# Patient Record
Sex: FEMALE | Race: WHITE | NOT HISPANIC OR LATINO
[De-identification: names, ages, dates, MRNs, and addresses within clinical notes are randomized per-mention and may not be internally consistent; named-entity substitution may affect disease eponyms.]

---

## 2018-12-17 ENCOUNTER — APPOINTMENT (OUTPATIENT)
Dept: OTOLARYNGOLOGY | Facility: CLINIC | Age: 28
End: 2018-12-17

## 2018-12-17 PROBLEM — Z00.00 ENCOUNTER FOR PREVENTIVE HEALTH EXAMINATION: Status: ACTIVE | Noted: 2018-12-17

## 2019-09-16 ENCOUNTER — APPOINTMENT (OUTPATIENT)
Dept: OTOLARYNGOLOGY | Facility: CLINIC | Age: 29
End: 2019-09-16
Payer: COMMERCIAL

## 2019-09-16 VITALS
DIASTOLIC BLOOD PRESSURE: 74 MMHG | WEIGHT: 125 LBS | SYSTOLIC BLOOD PRESSURE: 110 MMHG | OXYGEN SATURATION: 98 % | HEIGHT: 65 IN | HEART RATE: 80 BPM | BODY MASS INDEX: 20.83 KG/M2

## 2019-09-16 PROCEDURE — 99201 OFFICE OUTPATIENT NEW 10 MINUTES: CPT

## 2019-09-16 NOTE — PHYSICAL EXAM
[Normal] : normal appearance, well groomed, well nourished, and in no acute distress [de-identified] : left mid back scar/residual lipoma/fullness measuring approximately 3cm in diameter with hyperpigmentation scarring and palpable mobile fullness to the area

## 2019-09-16 NOTE — HISTORY OF PRESENT ILLNESS
[de-identified] : 29F w/ PMH of left back lipoma s/p excision, now with scarring/residual lipoma of left back. Patient reports she had a lipoma removed from her left back >2 years ago but has since noticed some fullness of the area. She denies any f/c, weight loss, night sweats, flank or abdominal pain.

## 2019-09-16 NOTE — ASSESSMENT
[FreeTextEntry1] : 29F w/ PMH of lipoma excision with residual lipoma and scarring.\par \par Plan:\par - in office scar revision and lipoma excision either 9/20 or 9/27

## 2019-09-30 ENCOUNTER — TRANSCRIPTION ENCOUNTER (OUTPATIENT)
Age: 29
End: 2019-09-30

## 2019-10-04 ENCOUNTER — APPOINTMENT (OUTPATIENT)
Dept: OTOLARYNGOLOGY | Facility: CLINIC | Age: 29
End: 2019-10-04
Payer: COMMERCIAL

## 2019-10-04 VITALS
DIASTOLIC BLOOD PRESSURE: 74 MMHG | BODY MASS INDEX: 20.49 KG/M2 | SYSTOLIC BLOOD PRESSURE: 112 MMHG | TEMPERATURE: 98 F | WEIGHT: 123 LBS | HEIGHT: 65 IN | OXYGEN SATURATION: 98 %

## 2019-10-04 PROCEDURE — 99213 OFFICE O/P EST LOW 20 MIN: CPT | Mod: 25

## 2019-10-04 PROCEDURE — 21930 EXC BACK LES SC < 3 CM: CPT | Mod: LT

## 2019-10-04 NOTE — ADDENDUM
[FreeTextEntry1] : Patient was advised she may shower with occlusive dressing in place, but to otherwise refrain from any stretching or heavy lifting to avoid pulling on the incision. She was asked to follow up Next week on Tuesday for incision check.

## 2019-10-08 ENCOUNTER — APPOINTMENT (OUTPATIENT)
Dept: OTOLARYNGOLOGY | Facility: CLINIC | Age: 29
End: 2019-10-08
Payer: COMMERCIAL

## 2019-10-08 VITALS
DIASTOLIC BLOOD PRESSURE: 65 MMHG | SYSTOLIC BLOOD PRESSURE: 104 MMHG | BODY MASS INDEX: 20.49 KG/M2 | OXYGEN SATURATION: 98 % | WEIGHT: 123 LBS | HEART RATE: 80 BPM | HEIGHT: 65 IN

## 2019-10-08 DIAGNOSIS — L90.5 SCAR CONDITIONS AND FIBROSIS OF SKIN: ICD-10-CM

## 2019-10-08 PROCEDURE — 99024 POSTOP FOLLOW-UP VISIT: CPT

## 2019-10-09 PROBLEM — L90.5 SCAR OF BACK: Status: ACTIVE | Noted: 2019-10-09

## 2019-10-09 NOTE — HISTORY OF PRESENT ILLNESS
[de-identified] : 29F w/ PMH of left back lipoma s/p excision, now with scarring/residual lipoma of left back. Patient reports she had a lipoma removed from her left back >2 years ago but has since noticed some fullness of the area. She denies any f/c, weight loss, night sweats, flank or abdominal pain.  [FreeTextEntry1] : 10/4/19- patient underwent in office scar excision and revision

## 2019-10-09 NOTE — PHYSICAL EXAM
[Normal] : normal appearance, well groomed, well nourished, and in no acute distress [de-identified] : left flank incision 3.5cm in length with steri strips in place c/d/i

## 2019-10-09 NOTE — ASSESSMENT
[FreeTextEntry1] : 29F w/ PMH of lipoma excision with residual lipoma and scarring.\par \par Plan:\par - F/u Monday 10/14 to recheck incision and f/u pathology

## 2019-10-14 ENCOUNTER — APPOINTMENT (OUTPATIENT)
Dept: OTOLARYNGOLOGY | Facility: CLINIC | Age: 29
End: 2019-10-14
Payer: COMMERCIAL

## 2019-10-14 VITALS — BODY MASS INDEX: 20.49 KG/M2 | WEIGHT: 123 LBS | HEIGHT: 65 IN

## 2019-10-14 DIAGNOSIS — D36.17 BENIGN NEOPLASM OF PERIPHERAL NERVES AND AUTONOMIC NERVOUS SYSTEM OF TRUNK, UNSPECIFIED: ICD-10-CM

## 2019-10-14 DIAGNOSIS — Z86.018 PERSONAL HISTORY OF OTHER BENIGN NEOPLASM: ICD-10-CM

## 2019-10-14 PROCEDURE — 99024 POSTOP FOLLOW-UP VISIT: CPT

## 2019-10-14 NOTE — HISTORY OF PRESENT ILLNESS
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  [FreeTextEntry1] : Healing well. No pain or concerns. Scar is thin and favorable in appearance. \par She had rash/reaction to mastisol and steri-strips that were used for closure.

## 2019-10-14 NOTE — REASON FOR VISIT
[Subsequent Evaluation] : a subsequent evaluation for [FreeTextEntry2] : prior excision of left back neurofibroma

## 2019-10-14 NOTE — ASSESSMENT
[FreeTextEntry1] : Assessment: left flank neurofibroma fully excised\par \par Plan: \par 1. follow up 2 months to re-evaluate scar\par 2. start applying silicone containing gel after a week for best scar outcome. Keep out of the sun

## 2019-12-16 ENCOUNTER — APPOINTMENT (OUTPATIENT)
Dept: OTOLARYNGOLOGY | Facility: CLINIC | Age: 29
End: 2019-12-16
Payer: COMMERCIAL

## 2019-12-16 VITALS
HEIGHT: 65 IN | BODY MASS INDEX: 20.49 KG/M2 | HEART RATE: 69 BPM | DIASTOLIC BLOOD PRESSURE: 76 MMHG | OXYGEN SATURATION: 98 % | WEIGHT: 123 LBS | SYSTOLIC BLOOD PRESSURE: 130 MMHG

## 2019-12-16 PROCEDURE — 99024 POSTOP FOLLOW-UP VISIT: CPT

## 2019-12-16 NOTE — ASSESSMENT
[FreeTextEntry1] : Assessment: \par 1. successful excision of left flank neurofibroma\par 2. hypertrophic scar-injected with Kenalog today\par \par Plan: \par 1. F/U 3 weeks for scar reassessment. \par \par

## 2019-12-16 NOTE — PHYSICAL EXAM
[de-identified] : left flank scar hypertrophic\par \par Procedure details: After informed consent, we've injected 10mg/mL Kenalog solution into the hypertrophic scar.

## 2019-12-16 NOTE — HISTORY OF PRESENT ILLNESS
[FreeTextEntry1] : Since our last assessment, the scar has widened and became hypertrophic. Couldn't tolerate adhesives because of skin reactivity. Doesn't use silicone sheets.  [de-identified] : 30 yo F who underwent excision of left flank neurofibroma.

## 2025-05-08 ENCOUNTER — APPOINTMENT (OUTPATIENT)
Dept: NEUROLOGY | Facility: CLINIC | Age: 35
End: 2025-05-08